# Patient Record
Sex: FEMALE | Race: WHITE | NOT HISPANIC OR LATINO | URBAN - METROPOLITAN AREA
[De-identification: names, ages, dates, MRNs, and addresses within clinical notes are randomized per-mention and may not be internally consistent; named-entity substitution may affect disease eponyms.]

---

## 2018-02-20 ENCOUNTER — OFFICE (OUTPATIENT)
Dept: URBAN - METROPOLITAN AREA CLINIC 12 | Facility: CLINIC | Age: 58
End: 2018-02-20

## 2018-02-20 VITALS
HEART RATE: 95 BPM | HEIGHT: 65 IN | SYSTOLIC BLOOD PRESSURE: 137 MMHG | DIASTOLIC BLOOD PRESSURE: 84 MMHG | WEIGHT: 133 LBS

## 2018-02-20 DIAGNOSIS — R10.33 PERIUMBILICAL PAIN: ICD-10-CM

## 2018-02-20 DIAGNOSIS — Z79.1 LONG TERM (CURRENT) USE OF NON-STEROIDAL ANTI-INFLAMMATORIES: ICD-10-CM

## 2018-02-20 DIAGNOSIS — R11.0 NAUSEA: ICD-10-CM

## 2018-02-20 DIAGNOSIS — R63.4 ABNORMAL WEIGHT LOSS: ICD-10-CM

## 2018-02-20 LAB
AMYLASE, SERUM: 62 U/L (ref 31–124)
CBC, PLATELET, NO DIFFERENTIAL: HEMATOCRIT: 36.5 % (ref 34–46.6)
CBC, PLATELET, NO DIFFERENTIAL: HEMOGLOBIN: 11.9 G/DL (ref 11.1–15.9)
CBC, PLATELET, NO DIFFERENTIAL: MCH: 29.8 PG (ref 26.6–33)
CBC, PLATELET, NO DIFFERENTIAL: MCHC: 32.6 G/DL (ref 31.5–35.7)
CBC, PLATELET, NO DIFFERENTIAL: MCV: 92 FL (ref 79–97)
CBC, PLATELET, NO DIFFERENTIAL: PLATELETS: 376 X10E3/UL (ref 150–379)
CBC, PLATELET, NO DIFFERENTIAL: RBC: 3.99 X10E6/UL (ref 3.77–5.28)
CBC, PLATELET, NO DIFFERENTIAL: RDW: 12.9 % (ref 12.3–15.4)
CBC, PLATELET, NO DIFFERENTIAL: WBC: 7.9 X10E3/UL (ref 3.4–10.8)
COMP. METABOLIC PANEL (14): A/G RATIO: 1.6 (ref 1.2–2.2)
COMP. METABOLIC PANEL (14): ALBUMIN, SERUM: 4.4 G/DL (ref 3.5–5.5)
COMP. METABOLIC PANEL (14): ALKALINE PHOSPHATASE, S: 138 IU/L — HIGH (ref 39–117)
COMP. METABOLIC PANEL (14): ALT (SGPT): 11 IU/L (ref 0–32)
COMP. METABOLIC PANEL (14): AST (SGOT): 11 IU/L (ref 0–40)
COMP. METABOLIC PANEL (14): BILIRUBIN, TOTAL: <0.2 MG/DL
COMP. METABOLIC PANEL (14): BUN/CREATININE RATIO: 25 — HIGH (ref 9–23)
COMP. METABOLIC PANEL (14): BUN: 20 MG/DL (ref 6–24)
COMP. METABOLIC PANEL (14): CALCIUM, SERUM: 11.2 MG/DL — HIGH (ref 8.7–10.2)
COMP. METABOLIC PANEL (14): CARBON DIOXIDE, TOTAL: 23 MMOL/L (ref 18–29)
COMP. METABOLIC PANEL (14): CHLORIDE, SERUM: 103 MMOL/L (ref 96–106)
COMP. METABOLIC PANEL (14): CREATININE, SERUM: 0.81 MG/DL (ref 0.57–1)
COMP. METABOLIC PANEL (14): EGFR IF AFRICN AM: 94
COMP. METABOLIC PANEL (14): EGFR IF NONAFRICN AM: 81
COMP. METABOLIC PANEL (14): GLOBULIN, TOTAL: 2.7 (ref 1.5–4.5)
COMP. METABOLIC PANEL (14): GLUCOSE, SERUM: 109 MG/DL — HIGH (ref 65–99)
COMP. METABOLIC PANEL (14): POTASSIUM, SERUM: 5 MMOL/L (ref 3.5–5.2)
COMP. METABOLIC PANEL (14): PROTEIN, TOTAL, SERUM: 7.1 G/DL (ref 6–8.5)
COMP. METABOLIC PANEL (14): SODIUM, SERUM: 141 MMOL/L (ref 134–144)
LIPASE, SERUM: 24 U/L (ref 14–72)

## 2018-02-20 PROCEDURE — 99204 OFFICE O/P NEW MOD 45 MIN: CPT | Performed by: INTERNAL MEDICINE

## 2018-02-20 RX ORDER — OMEPRAZOLE 40 MG/1
80 CAPSULE, DELAYED RELEASE ORAL
Qty: 60 | Refills: 11 | Status: ACTIVE
Start: 2018-02-20

## 2018-02-20 RX ORDER — ONDANSETRON 8 MG/1
24 TABLET, ORALLY DISINTEGRATING ORAL
Qty: 20 | Refills: 1 | Status: COMPLETED
Start: 2018-02-20 | End: 2018-05-23

## 2018-02-20 NOTE — SERVICEHPINOTES
Ms. Pack is a 57-year-old female who is the wife of one of my other patients who presents with abdominal pain. She states that she has had issues with abdominal pain off and on for years and has been seen in the past by Dr. Harrell and Dr. Kamara. She underwent CT scan in 2004 which was negative except for a chandana either cyst. She also underwent EGD in 2010 that did reveal some mild erosive gastritis and some healing duodenal ulcers in the setting of NSAID use. Biopsies were negative for H pylori. She apparently did better until recently. She states about two weeks ago she started noticing a stabbing pain in her periumbilical area and across her upper abdomen. The pain is a constant pain that is there all the time and does not radiate to the back. It is not made better or worse with eating. She continues to have bowel movements and passes flatus. It is better whenever she lays down but is worse when she stands up and walks. She has been taking Alkaselzer and Pepto-Bismol on a regular basis. She also has been taking Excedrin Extra Strength twice a day for quite a while because of headaches. This medicine includes aspirin. She has had some associated nausea and did vomit once this morning. She denies any hematemesis or melena. She thinks that she may have lost about 5 lb over the past week. There is no first-degree family history of colon cancer or colon polyps. She has not had a colonoscopy.

## 2018-02-20 NOTE — SERVICENOTES
Given her history of peptic ulcer disease and persistent NSAID use peptic ulcer is definitely in the differential.  Contained perforation is also in the differential.  Her abdomen is soft and flat but tender today.  I will go ahead and check some basic labs and get a CT scan today.  I did recommend she go to the emergency department if she has more severe pain or fevers.  If CT scan is negative we will proceed with EGD on Thursday.  In the meantime we will start her on a PPI as well as some nausea medication.  I recommended she stick to a very bland diet for now and notify us if things worsen in the meantime.  She will eventually need colonoscopy at some point for average risk screening but this can wait until after her current acute issue has resolved.

## 2018-02-22 ENCOUNTER — AMBULATORY SURGICAL CENTER (OUTPATIENT)
Dept: URBAN - METROPOLITAN AREA SURGERY 2 | Facility: SURGERY | Age: 58
End: 2018-02-22

## 2018-02-22 ENCOUNTER — AMBULATORY SURGICAL CENTER (OUTPATIENT)
Dept: URBAN - METROPOLITAN AREA SURGERY 2 | Facility: SURGERY | Age: 58
End: 2018-02-22
Payer: MEDICARE

## 2018-02-22 ENCOUNTER — OFFICE (OUTPATIENT)
Dept: URBAN - METROPOLITAN AREA PATHOLOGY 22 | Facility: PATHOLOGY | Age: 58
End: 2018-02-22

## 2018-02-22 VITALS
HEART RATE: 68 BPM | SYSTOLIC BLOOD PRESSURE: 109 MMHG | HEART RATE: 69 BPM | DIASTOLIC BLOOD PRESSURE: 53 MMHG | DIASTOLIC BLOOD PRESSURE: 65 MMHG | HEART RATE: 69 BPM | DIASTOLIC BLOOD PRESSURE: 67 MMHG | DIASTOLIC BLOOD PRESSURE: 53 MMHG | RESPIRATION RATE: 14 BRPM | SYSTOLIC BLOOD PRESSURE: 90 MMHG | TEMPERATURE: 97.8 F | SYSTOLIC BLOOD PRESSURE: 93 MMHG | SYSTOLIC BLOOD PRESSURE: 93 MMHG | SYSTOLIC BLOOD PRESSURE: 109 MMHG | DIASTOLIC BLOOD PRESSURE: 55 MMHG | WEIGHT: 133 LBS | WEIGHT: 133 LBS | SYSTOLIC BLOOD PRESSURE: 110 MMHG | OXYGEN SATURATION: 99 % | TEMPERATURE: 98.4 F | HEIGHT: 65 IN | OXYGEN SATURATION: 99 % | DIASTOLIC BLOOD PRESSURE: 67 MMHG | HEART RATE: 66 BPM | HEART RATE: 68 BPM | RESPIRATION RATE: 14 BRPM | SYSTOLIC BLOOD PRESSURE: 110 MMHG | HEIGHT: 65 IN | OXYGEN SATURATION: 96 % | TEMPERATURE: 97.8 F | SYSTOLIC BLOOD PRESSURE: 90 MMHG | TEMPERATURE: 98.4 F | DIASTOLIC BLOOD PRESSURE: 55 MMHG | DIASTOLIC BLOOD PRESSURE: 65 MMHG | HEART RATE: 66 BPM | OXYGEN SATURATION: 96 %

## 2018-02-22 DIAGNOSIS — K26.9 DUODENAL ULCER, UNSPECIFIED AS ACUTE OR CHRONIC, WITHOUT HEM: ICD-10-CM

## 2018-02-22 DIAGNOSIS — K52.9 NONINFECTIVE GASTROENTERITIS AND COLITIS, UNSPECIFIED: ICD-10-CM

## 2018-02-22 DIAGNOSIS — R11.0 NAUSEA: ICD-10-CM

## 2018-02-22 DIAGNOSIS — R10.33 PERIUMBILICAL PAIN: ICD-10-CM

## 2018-02-22 DIAGNOSIS — K31.89 OTHER DISEASES OF STOMACH AND DUODENUM: ICD-10-CM

## 2018-02-22 PROCEDURE — 43239 EGD BIOPSY SINGLE/MULTIPLE: CPT | Performed by: INTERNAL MEDICINE

## 2018-02-22 PROCEDURE — G8907 PT DOC NO EVENTS ON DISCHARG: HCPCS | Performed by: INTERNAL MEDICINE

## 2018-02-22 PROCEDURE — 88313 SPECIAL STAINS GROUP 2: CPT | Performed by: INTERNAL MEDICINE

## 2018-02-22 PROCEDURE — 88305 TISSUE EXAM BY PATHOLOGIST: CPT | Performed by: INTERNAL MEDICINE

## 2018-02-22 PROCEDURE — G8918 PT W/O PREOP ORDER IV AB PRO: HCPCS | Performed by: INTERNAL MEDICINE

## 2018-02-22 PROCEDURE — 88312 SPECIAL STAINS GROUP 1: CPT | Performed by: INTERNAL MEDICINE

## 2018-02-22 PROCEDURE — 88342 IMHCHEM/IMCYTCHM 1ST ANTB: CPT | Performed by: INTERNAL MEDICINE

## 2018-02-22 RX ORDER — OMEPRAZOLE 40 MG/1
80 CAPSULE, DELAYED RELEASE ORAL
Qty: 60 | Refills: 11 | Status: ACTIVE
Start: 2018-02-20

## 2018-02-22 RX ORDER — SUCRALFATE 1 G/1
4 TABLET ORAL
Qty: 120 | Refills: 2 | Status: COMPLETED
Start: 2018-02-22 | End: 2018-04-12

## 2018-03-13 PROBLEM — K31.89 OTHER DISEASES OF STOMACH AND DUODENUM: Status: ACTIVE | Noted: 2018-02-22

## 2018-04-12 ENCOUNTER — AMBULATORY SURGICAL CENTER (OUTPATIENT)
Dept: URBAN - METROPOLITAN AREA SURGERY 2 | Facility: SURGERY | Age: 58
End: 2018-04-12

## 2018-04-12 ENCOUNTER — OFFICE (OUTPATIENT)
Dept: URBAN - METROPOLITAN AREA PATHOLOGY 22 | Facility: PATHOLOGY | Age: 58
End: 2018-04-12

## 2018-04-12 ENCOUNTER — AMBULATORY SURGICAL CENTER (OUTPATIENT)
Dept: URBAN - METROPOLITAN AREA SURGERY 2 | Facility: SURGERY | Age: 58
End: 2018-04-12
Payer: MEDICARE

## 2018-04-12 VITALS
HEART RATE: 67 BPM | SYSTOLIC BLOOD PRESSURE: 82 MMHG | SYSTOLIC BLOOD PRESSURE: 112 MMHG | SYSTOLIC BLOOD PRESSURE: 92 MMHG | SYSTOLIC BLOOD PRESSURE: 82 MMHG | OXYGEN SATURATION: 94 % | OXYGEN SATURATION: 96 % | WEIGHT: 130 LBS | RESPIRATION RATE: 14 BRPM | HEIGHT: 65 IN | OXYGEN SATURATION: 95 % | WEIGHT: 130 LBS | OXYGEN SATURATION: 95 % | RESPIRATION RATE: 18 BRPM | SYSTOLIC BLOOD PRESSURE: 112 MMHG | DIASTOLIC BLOOD PRESSURE: 60 MMHG | TEMPERATURE: 98.2 F | SYSTOLIC BLOOD PRESSURE: 98 MMHG | SYSTOLIC BLOOD PRESSURE: 98 MMHG | RESPIRATION RATE: 16 BRPM | DIASTOLIC BLOOD PRESSURE: 55 MMHG | OXYGEN SATURATION: 94 % | HEIGHT: 65 IN | TEMPERATURE: 97.7 F | RESPIRATION RATE: 14 BRPM | DIASTOLIC BLOOD PRESSURE: 55 MMHG | DIASTOLIC BLOOD PRESSURE: 52 MMHG | DIASTOLIC BLOOD PRESSURE: 52 MMHG | RESPIRATION RATE: 16 BRPM | OXYGEN SATURATION: 96 % | HEART RATE: 71 BPM | DIASTOLIC BLOOD PRESSURE: 60 MMHG | HEART RATE: 63 BPM | HEART RATE: 71 BPM | SYSTOLIC BLOOD PRESSURE: 92 MMHG | HEART RATE: 63 BPM | RESPIRATION RATE: 18 BRPM | HEART RATE: 67 BPM | TEMPERATURE: 97.7 F | TEMPERATURE: 98.2 F

## 2018-04-12 DIAGNOSIS — K29.50 UNSPECIFIED CHRONIC GASTRITIS WITHOUT BLEEDING: ICD-10-CM

## 2018-04-12 DIAGNOSIS — D12.8 BENIGN NEOPLASM OF RECTUM: ICD-10-CM

## 2018-04-12 DIAGNOSIS — D12.5 BENIGN NEOPLASM OF SIGMOID COLON: ICD-10-CM

## 2018-04-12 DIAGNOSIS — K63.5 POLYP OF COLON: ICD-10-CM

## 2018-04-12 DIAGNOSIS — R10.13 EPIGASTRIC PAIN: ICD-10-CM

## 2018-04-12 DIAGNOSIS — K64.0 FIRST DEGREE HEMORRHOIDS: ICD-10-CM

## 2018-04-12 DIAGNOSIS — Z12.11 ENCOUNTER FOR SCREENING FOR MALIGNANT NEOPLASM OF COLON: ICD-10-CM

## 2018-04-12 DIAGNOSIS — K26.9 DUODENAL ULCER, UNSPECIFIED AS ACUTE OR CHRONIC, WITHOUT HEM: ICD-10-CM

## 2018-04-12 DIAGNOSIS — K63.89 OTHER SPECIFIED DISEASES OF INTESTINE: ICD-10-CM

## 2018-04-12 DIAGNOSIS — K31.5 OBSTRUCTION OF DUODENUM: ICD-10-CM

## 2018-04-12 DIAGNOSIS — Z79.1 LONG TERM (CURRENT) USE OF NON-STEROIDAL ANTI-INFLAMMATORIES: ICD-10-CM

## 2018-04-12 PROBLEM — K62.1 RECTAL POLYP: Status: ACTIVE | Noted: 2018-04-12

## 2018-04-12 PROBLEM — D12.4 BENIGN NEOPLASM OF DESCENDING COLON: Status: ACTIVE | Noted: 2018-04-12

## 2018-04-12 PROCEDURE — G8918 PT W/O PREOP ORDER IV AB PRO: HCPCS | Performed by: INTERNAL MEDICINE

## 2018-04-12 PROCEDURE — 88342 IMHCHEM/IMCYTCHM 1ST ANTB: CPT | Performed by: INTERNAL MEDICINE

## 2018-04-12 PROCEDURE — 43239 EGD BIOPSY SINGLE/MULTIPLE: CPT | Mod: 51 | Performed by: INTERNAL MEDICINE

## 2018-04-12 PROCEDURE — 88305 TISSUE EXAM BY PATHOLOGIST: CPT | Performed by: INTERNAL MEDICINE

## 2018-04-12 PROCEDURE — 45385 COLONOSCOPY W/LESION REMOVAL: CPT | Mod: PT | Performed by: INTERNAL MEDICINE

## 2018-04-12 PROCEDURE — 88313 SPECIAL STAINS GROUP 2: CPT | Performed by: INTERNAL MEDICINE

## 2018-04-12 PROCEDURE — G8907 PT DOC NO EVENTS ON DISCHARG: HCPCS | Performed by: INTERNAL MEDICINE

## 2018-04-12 RX ORDER — SUCRALFATE 1 G/1
4 TABLET ORAL
Qty: 120 | Refills: 2 | Status: COMPLETED
Start: 2018-02-22 | End: 2018-04-12

## 2018-04-12 RX ORDER — OMEPRAZOLE 40 MG/1
80 CAPSULE, DELAYED RELEASE ORAL
Qty: 60 | Refills: 11 | Status: ACTIVE
Start: 2018-02-20

## 2018-05-23 ENCOUNTER — OFFICE (OUTPATIENT)
Dept: URBAN - METROPOLITAN AREA CLINIC 11 | Facility: CLINIC | Age: 58
End: 2018-05-23

## 2018-05-23 VITALS
SYSTOLIC BLOOD PRESSURE: 103 MMHG | DIASTOLIC BLOOD PRESSURE: 58 MMHG | WEIGHT: 138 LBS | HEIGHT: 65 IN | HEART RATE: 79 BPM

## 2018-05-23 DIAGNOSIS — R10.13 EPIGASTRIC PAIN: ICD-10-CM

## 2018-05-23 DIAGNOSIS — K31.5 OBSTRUCTION OF DUODENUM: ICD-10-CM

## 2018-05-23 DIAGNOSIS — K26.9 DUODENAL ULCER, UNSPECIFIED AS ACUTE OR CHRONIC, WITHOUT HEM: ICD-10-CM

## 2018-05-23 DIAGNOSIS — Z79.1 LONG TERM (CURRENT) USE OF NON-STEROIDAL ANTI-INFLAMMATORIES: ICD-10-CM

## 2018-05-23 DIAGNOSIS — Z86.010 PERSONAL HISTORY OF COLONIC POLYPS: ICD-10-CM

## 2018-05-23 PROCEDURE — 99213 OFFICE O/P EST LOW 20 MIN: CPT | Performed by: INTERNAL MEDICINE

## 2018-05-23 RX ORDER — HYOSCYAMINE SULFATE 0.12 MG/1
TABLET ORAL; SUBLINGUAL
Qty: 90 | Refills: 3 | Status: ACTIVE
Start: 2018-05-23

## 2018-05-23 NOTE — SERVICEHPINOTES
Ms. Pack is a 57-year-old female who is the wife of one of my other patients who is here for follow up of abdominal pain due to severe duodenal ulcer and stricture. She states that she has had issues with abdominal pain off and on for years and has been seen in the past by Dr. Harrell and Dr. Kamara. She underwent CT scan in 2004 which was negative except for a follicular cyst. She also underwent EGD in 2010 that did reveal some mild erosive gastritis and some healing duodenal ulcers in the setting of NSAID use. Biopsies were negative for H pylori. She apparently did better until early 2018 when started noticing a stabbing pain in her periumbilical area and across her upper abdomen. The pain was a constant pain that is there all the time and does not radiate to the back. She was taking Excedrin Extra Strength twice a day for quite a while because of headaches. This medicine includes aspirin. I initially saw her because her symptoms in February 2018 and at that time blood work was overall negative except for mild elevation of alkaline phosphatase at around 1:38 a.m.. CBC was normal. Pancreatic enzymes were also normal. She underwent EGD which did reveal a single cratered 3 cm ulcer in the duodenal bulb causing distortion of the duodenal bulb with partial gastric outlet obstruction. There was also diffuse gastric erythema. Biopsies of the stomach were negative for H pylori. Duodenal bulb ulcer biopsies only revealed acute enteritis with ulceration with no evidence of any neoplasia. There was some evidence of some Candida. Because of the finding I did have her undergo a CT scan with contrast which did not reveal any evidence of any mass or adenopathy. We did place her on high-dose PPI and Carafate and she underwent repeat EGD around six weeks later which did reveal significant healing of the ulcer with only a small scar in the duodenal bulb though there was still deformity of the bulb with relative narrowing of the distal bulb to around 16 to 17 mm. Dilation could not be performed due to the sharp angle and inability to feed the dilator. The scope was also and unstable position because of the anatomy. Colonoscopy was performed at the same time with removal of a few polyps including one tubulovillous adenoma. Her next colonoscopy is due in 2021. She now comes in for follow-up stating that overall she is doing much better. She has gained around 5 lb since our initial visit. She still does have some epigastric pain that is sharp but only occurs around twice a week. It is up to 7/10 and usually occurs after eating. It can last for a couple of hours. This is much better than what it was before. She denies any fevers, chills, nausea, or vomiting. .

## 2018-05-23 NOTE — SERVICENOTES
The patient is overall doing much better.  Her symptoms do appear to be due to a very large duodenal ulcer which has cause significant distortion of the duodenal bulb with narrowing of the junction of the bulb and second portion of the duodenum.  This has caused a relative gastric outlet obstruction.  I have again educated the patient on the need for her to stay on a low residue diet any multiple small meals a day.  I will give her a trial of an antispasmodic to see if this might help her pain.  We will also have her undergo an upper GI series to see if there is significant mechanical outlet issues.  If this is the case or if pain returns and worsens, or if she starts losing weight then she may need surgical evaluation of this.  There does not appear to be any associated neoplasia or cancer based on CT scan and improvement of the ulcer, though we will continue to watch carefully for signs of worsening symptoms.  She was instructed to stay away from NSAIDs.

## 2018-10-31 ENCOUNTER — OFFICE (OUTPATIENT)
Dept: URBAN - METROPOLITAN AREA CLINIC 19 | Facility: CLINIC | Age: 58
End: 2018-10-31

## 2018-10-31 VITALS
SYSTOLIC BLOOD PRESSURE: 104 MMHG | DIASTOLIC BLOOD PRESSURE: 64 MMHG | WEIGHT: 124 LBS | HEART RATE: 85 BPM | HEIGHT: 65 IN

## 2018-10-31 DIAGNOSIS — K26.9 DUODENAL ULCER, UNSPECIFIED AS ACUTE OR CHRONIC, WITHOUT HEM: ICD-10-CM

## 2018-10-31 DIAGNOSIS — Z86.010 PERSONAL HISTORY OF COLONIC POLYPS: ICD-10-CM

## 2018-10-31 DIAGNOSIS — Z79.1 LONG TERM (CURRENT) USE OF NON-STEROIDAL ANTI-INFLAMMATORIES: ICD-10-CM

## 2018-10-31 DIAGNOSIS — R10.13 EPIGASTRIC PAIN: ICD-10-CM

## 2018-10-31 DIAGNOSIS — R63.4 ABNORMAL WEIGHT LOSS: ICD-10-CM

## 2018-10-31 DIAGNOSIS — K31.5 OBSTRUCTION OF DUODENUM: ICD-10-CM

## 2018-10-31 PROCEDURE — 99213 OFFICE O/P EST LOW 20 MIN: CPT | Performed by: INTERNAL MEDICINE

## 2018-10-31 RX ORDER — OMEPRAZOLE 40 MG/1
80 CAPSULE, DELAYED RELEASE ORAL
Qty: 60 | Refills: 11 | Status: ACTIVE
Start: 2018-02-20

## 2018-10-31 RX ORDER — SUCRALFATE 1 G/10ML
1000 SUSPENSION ORAL
Qty: 24 | Refills: 0 | Status: ACTIVE
Start: 2018-10-31

## 2018-10-31 NOTE — SERVICENOTES
The patient has had return of epigastric pain in the setting of taking NSAIDs over the past two months.  I am concerned that she may have recurrence of her ulcer or may have worsening of the stricture.  The differential does also include neoplasm of either the intestine or pancreas as well. Because her return of symptoms and weight loss I would like to repeat EGD for further evaluation with biopsies.  I will also schedule her for CT scan pancreas protocol as well. We can also consider upper GI series depending EGD results to evaluate if there is significant stricture.  In the meantime I did recommend she refrain from Advil use and increase PPI back to twice a day.  I will also give her some samples of sucralfate today.  I did recommend that she eat small frequent meals, stick to higher calorie liquids and also used nutrition supplementation as well.

## 2018-10-31 NOTE — SERVICEHPINOTES
Ms. Pack is a 58-year-old female who is the wife of one of my other patients who is here for follow up of abdominal pain due to severe duodenal ulcer and stricture. She states that she has had issues with abdominal pain off and on for years and has been seen in the past by Dr. Harrell and Dr. Kamara. She underwent CT scan in 2004 which was negative except for a follicular cyst. She also underwent EGD in 2010 that did reveal some mild erosive gastritis and some healing duodenal ulcers in the setting of NSAID use. Biopsies were negative for H pylori. She apparently did better until early 2018 when started noticing a stabbing pain in her periumbilical area and across her upper abdomen. The pain was a constant pain that is there all the time and does not radiate to the back. She was taking Excedrin Extra Strength twice a day for quite a while because of headaches. This medicine includes aspirin. I initially saw her because her symptoms in February 2018 and at that time blood work was overall negative except for mild elevation of alkaline phosphatase at around 1:38 a.m.. CBC was normal. Pancreatic enzymes were also normal. She underwent EGD which did reveal a single cratered 3 cm ulcer in the duodenal bulb causing distortion of the duodenal bulb with partial gastric outlet obstruction. There was also diffuse gastric erythema. Biopsies of the stomach were negative for H pylori. Duodenal bulb ulcer biopsies only revealed acute enteritis with ulceration with no evidence of any neoplasia. There was some evidence of some Candida. Because of the finding I did have her undergo a CT scan with contrast which did not reveal any evidence of any mass or adenopathy. We did place her on high-dose PPI and Carafate and she underwent repeat EGD around six weeks later which did reveal significant healing of the ulcer with only a small scar in the duodenal bulb though there was still deformity of the bulb with relative narrowing of the distal bulb to around 16 to 17 mm. Dilation could not be performed due to the sharp angle and inability to feed the dilator. The scope was also and unstable position because of the anatomy. Colonoscopy was performed at the same time with removal of a few polyps including one tubulovillous adenoma. Her next colonoscopy is due in 2021. When I last saw her she was doing better and actually gained about 5 lb. Unfortunately, over the past 2-3 weeks the patient states that her symptoms have worsened. She has only been taking omeprazole once a day and has not been able to take Carafate. She states that she has returned to taking Advil on a daily basis because of headaches for the past two months. She has had a decreased appetite and has lost about 15 lb since our last visit. She tries to take Tums and Pepto-Bismol which helps somewhat. She continues to take Levsin which does not really help her abdominal pain that much. She describes the pain as a sharp pain that is in her upper abdomen that does not radiate. She denies any nausea, vomiting, fevers, or chills. BR

## 2018-11-02 ENCOUNTER — AMBULATORY SURGICAL CENTER (OUTPATIENT)
Dept: URBAN - METROPOLITAN AREA SURGERY 3 | Facility: SURGERY | Age: 58
End: 2018-11-02

## 2018-11-02 ENCOUNTER — OFFICE (OUTPATIENT)
Dept: URBAN - METROPOLITAN AREA PATHOLOGY 22 | Facility: PATHOLOGY | Age: 58
End: 2018-11-02

## 2018-11-02 ENCOUNTER — AMBULATORY SURGICAL CENTER (OUTPATIENT)
Dept: URBAN - METROPOLITAN AREA SURGERY 3 | Facility: SURGERY | Age: 58
End: 2018-11-02
Payer: MEDICARE

## 2018-11-02 VITALS
DIASTOLIC BLOOD PRESSURE: 77 MMHG | RESPIRATION RATE: 22 BRPM | DIASTOLIC BLOOD PRESSURE: 54 MMHG | DIASTOLIC BLOOD PRESSURE: 53 MMHG | HEART RATE: 78 BPM | SYSTOLIC BLOOD PRESSURE: 93 MMHG | HEIGHT: 65 IN | TEMPERATURE: 97 F | SYSTOLIC BLOOD PRESSURE: 104 MMHG | SYSTOLIC BLOOD PRESSURE: 132 MMHG | SYSTOLIC BLOOD PRESSURE: 104 MMHG | RESPIRATION RATE: 18 BRPM | WEIGHT: 125 LBS | TEMPERATURE: 97 F | RESPIRATION RATE: 20 BRPM | HEART RATE: 78 BPM | HEART RATE: 72 BPM | OXYGEN SATURATION: 98 % | RESPIRATION RATE: 20 BRPM | OXYGEN SATURATION: 96 % | WEIGHT: 125 LBS | DIASTOLIC BLOOD PRESSURE: 53 MMHG | SYSTOLIC BLOOD PRESSURE: 90 MMHG | RESPIRATION RATE: 22 BRPM | DIASTOLIC BLOOD PRESSURE: 64 MMHG | RESPIRATION RATE: 16 BRPM | DIASTOLIC BLOOD PRESSURE: 58 MMHG | DIASTOLIC BLOOD PRESSURE: 64 MMHG | HEART RATE: 75 BPM | SYSTOLIC BLOOD PRESSURE: 132 MMHG | HEIGHT: 65 IN | SYSTOLIC BLOOD PRESSURE: 89 MMHG | HEART RATE: 70 BPM | DIASTOLIC BLOOD PRESSURE: 58 MMHG | SYSTOLIC BLOOD PRESSURE: 89 MMHG | DIASTOLIC BLOOD PRESSURE: 77 MMHG | SYSTOLIC BLOOD PRESSURE: 90 MMHG | HEART RATE: 75 BPM | TEMPERATURE: 97.6 F | OXYGEN SATURATION: 98 % | RESPIRATION RATE: 16 BRPM | OXYGEN SATURATION: 96 % | DIASTOLIC BLOOD PRESSURE: 54 MMHG | SYSTOLIC BLOOD PRESSURE: 93 MMHG | HEART RATE: 72 BPM | HEART RATE: 70 BPM | RESPIRATION RATE: 18 BRPM | TEMPERATURE: 97.6 F

## 2018-11-02 DIAGNOSIS — K26.9 DUODENAL ULCER, UNSPECIFIED AS ACUTE OR CHRONIC, WITHOUT HEM: ICD-10-CM

## 2018-11-02 DIAGNOSIS — K52.9 NONINFECTIVE GASTROENTERITIS AND COLITIS, UNSPECIFIED: ICD-10-CM

## 2018-11-02 DIAGNOSIS — T18.2XXA FOREIGN BODY IN STOMACH, INITIAL ENCOUNTER: ICD-10-CM

## 2018-11-02 DIAGNOSIS — K31.89 OTHER DISEASES OF STOMACH AND DUODENUM: ICD-10-CM

## 2018-11-02 DIAGNOSIS — R10.13 EPIGASTRIC PAIN: ICD-10-CM

## 2018-11-02 PROCEDURE — G8918 PT W/O PREOP ORDER IV AB PRO: HCPCS | Performed by: INTERNAL MEDICINE

## 2018-11-02 PROCEDURE — 43239 EGD BIOPSY SINGLE/MULTIPLE: CPT | Performed by: INTERNAL MEDICINE

## 2018-11-02 PROCEDURE — 88305 TISSUE EXAM BY PATHOLOGIST: CPT | Performed by: INTERNAL MEDICINE

## 2018-11-02 PROCEDURE — 88342 IMHCHEM/IMCYTCHM 1ST ANTB: CPT | Performed by: INTERNAL MEDICINE

## 2018-11-02 PROCEDURE — 88313 SPECIAL STAINS GROUP 2: CPT | Performed by: INTERNAL MEDICINE

## 2018-11-02 PROCEDURE — G8907 PT DOC NO EVENTS ON DISCHARG: HCPCS | Performed by: INTERNAL MEDICINE

## 2018-11-02 RX ORDER — SUCRALFATE 1 G/10ML
SUSPENSION ORAL
Qty: 1 | Refills: 3 | Status: ACTIVE
Start: 2018-11-02